# Patient Record
Sex: MALE | Race: AMERICAN INDIAN OR ALASKA NATIVE | NOT HISPANIC OR LATINO | Employment: OTHER | URBAN - METROPOLITAN AREA
[De-identification: names, ages, dates, MRNs, and addresses within clinical notes are randomized per-mention and may not be internally consistent; named-entity substitution may affect disease eponyms.]

---

## 2023-07-07 LAB
ACTIVATED PARTIAL THROMBOPLASTIN TIME IN PPP BY COAGULATION ASSAY: 35 SEC (ref 27–38)
ALANINE AMINOTRANSFERASE (SGPT) (U/L) IN SER/PLAS: 18 U/L (ref 10–52)
ALBUMIN (G/DL) IN SER/PLAS: 4.7 G/DL (ref 3.4–5)
ALKALINE PHOSPHATASE (U/L) IN SER/PLAS: 42 U/L (ref 33–120)
ANION GAP IN SER/PLAS: 11 MMOL/L (ref 10–20)
ASPARTATE AMINOTRANSFERASE (SGOT) (U/L) IN SER/PLAS: 19 U/L (ref 9–39)
BASOPHILS (10*3/UL) IN BLOOD BY AUTOMATED COUNT: 0.02 X10E9/L (ref 0–0.1)
BASOPHILS/100 LEUKOCYTES IN BLOOD BY AUTOMATED COUNT: 0.4 % (ref 0–2)
BILIRUBIN TOTAL (MG/DL) IN SER/PLAS: 0.6 MG/DL (ref 0–1.2)
CALCIUM (MG/DL) IN SER/PLAS: 9.6 MG/DL (ref 8.6–10.3)
CARBON DIOXIDE, TOTAL (MMOL/L) IN SER/PLAS: 29 MMOL/L (ref 21–32)
CHLORIDE (MMOL/L) IN SER/PLAS: 103 MMOL/L (ref 98–107)
CREATININE (MG/DL) IN SER/PLAS: 0.93 MG/DL (ref 0.5–1.3)
EOSINOPHILS (10*3/UL) IN BLOOD BY AUTOMATED COUNT: 0.15 X10E9/L (ref 0–0.7)
EOSINOPHILS/100 LEUKOCYTES IN BLOOD BY AUTOMATED COUNT: 2.9 % (ref 0–6)
ERYTHROCYTE DISTRIBUTION WIDTH (RATIO) BY AUTOMATED COUNT: 12 % (ref 11.5–14.5)
ERYTHROCYTE MEAN CORPUSCULAR HEMOGLOBIN CONCENTRATION (G/DL) BY AUTOMATED: 33.3 G/DL (ref 32–36)
ERYTHROCYTE MEAN CORPUSCULAR VOLUME (FL) BY AUTOMATED COUNT: 87 FL (ref 80–100)
ERYTHROCYTES (10*6/UL) IN BLOOD BY AUTOMATED COUNT: 5.21 X10E12/L (ref 4.5–5.9)
GFR MALE: >90 ML/MIN/1.73M2
GLUCOSE (MG/DL) IN SER/PLAS: 93 MG/DL (ref 74–99)
HEMATOCRIT (%) IN BLOOD BY AUTOMATED COUNT: 45.4 % (ref 41–52)
HEMOGLOBIN (G/DL) IN BLOOD: 15.1 G/DL (ref 13.5–17.5)
IMMATURE GRANULOCYTES/100 LEUKOCYTES IN BLOOD BY AUTOMATED COUNT: 0.2 % (ref 0–0.9)
INR IN PPP BY COAGULATION ASSAY: 1.1 (ref 0.9–1.1)
LEUKOCYTES (10*3/UL) IN BLOOD BY AUTOMATED COUNT: 5.2 X10E9/L (ref 4.4–11.3)
LYMPHOCYTES (10*3/UL) IN BLOOD BY AUTOMATED COUNT: 1.88 X10E9/L (ref 1.2–4.8)
LYMPHOCYTES/100 LEUKOCYTES IN BLOOD BY AUTOMATED COUNT: 35.9 % (ref 13–44)
MONOCYTES (10*3/UL) IN BLOOD BY AUTOMATED COUNT: 0.4 X10E9/L (ref 0.1–1)
MONOCYTES/100 LEUKOCYTES IN BLOOD BY AUTOMATED COUNT: 7.6 % (ref 2–10)
NEUTROPHILS (10*3/UL) IN BLOOD BY AUTOMATED COUNT: 2.78 X10E9/L (ref 1.2–7.7)
NEUTROPHILS/100 LEUKOCYTES IN BLOOD BY AUTOMATED COUNT: 53 % (ref 40–80)
PLATELETS (10*3/UL) IN BLOOD AUTOMATED COUNT: 220 X10E9/L (ref 150–450)
POTASSIUM (MMOL/L) IN SER/PLAS: 4 MMOL/L (ref 3.5–5.3)
PROTEIN TOTAL: 6.9 G/DL (ref 6.4–8.2)
PROTHROMBIN TIME (PT) IN PPP BY COAGULATION ASSAY: 12.1 SEC (ref 9.8–12.8)
SODIUM (MMOL/L) IN SER/PLAS: 139 MMOL/L (ref 136–145)
UREA NITROGEN (MG/DL) IN SER/PLAS: 14 MG/DL (ref 6–23)

## 2023-07-24 ENCOUNTER — HOSPITAL ENCOUNTER (OUTPATIENT)
Dept: DATA CONVERSION | Facility: HOSPITAL | Age: 28
End: 2023-07-24
Attending: ORTHOPAEDIC SURGERY | Admitting: ORTHOPAEDIC SURGERY
Payer: MEDICARE

## 2023-07-24 DIAGNOSIS — M67.51 PLICA SYNDROME, RIGHT KNEE: ICD-10-CM

## 2023-07-24 DIAGNOSIS — M23.91 UNSPECIFIED INTERNAL DERANGEMENT OF RIGHT KNEE: ICD-10-CM

## 2023-09-30 NOTE — H&P
History & Physical Reviewed:   I have reviewed the History and Physical dated:  18-Jul-2023   History and Physical reviewed and relevant findings noted. Patient examined to review pertinent physical  findings.: No significant changes   Home Medications Reviewed: no changes noted   Allergies Reviewed: no changes noted     Consent:   COVID-19 Consent:  ·  COVID-19 Risk Consent Surgeon has reviewed key risks related to the risk of marti COVID-19 and if they contract COVID-19 what the risks are.       Electronic Signatures:  Dario Henry)  (Signed 24-Jul-2023 07:36)   Authored: History & Physical Reviewed, Consent, Note  Completion      Last Updated: 24-Jul-2023 07:36 by Dario Henry)

## 2023-10-02 NOTE — OP NOTE
PROCEDURE DETAILS    Preoperative Diagnosis:  Right knee medial plica syndrome    Postoperative Diagnosis:  Right knee medial plica syndrome    Surgeon: Dario Henry  Resident/Fellow/Other Assistant: Galo Clinton    Procedure:  Right knee arthroscopy excision of medial plica chondroplasty medial femoral condyle    Anesthesia: General + local   Estimated Blood Loss: 5 ml   Findings: see op report         Operative Report:   Operative findings: (Outerbridge classification 0-4)   Patella: 0  Trochlea: 0  Medial compartment: Small area of chondral abrasion/hypertrophy likely mechanical most medial aspect not articulating  Lateral compartment: 0    Operative Indications:  Patient was noted to have internal derangement of the knee refractory to non-surgical modalities.  We discussed associated interventions and the risks of the surgery, including DVT/PE, infection, stiffness, medical complications,  and incomplete relief of pre-operative symptoms.    Implants:  None    Operative Procedure:  The patient was met prior to surgery and the appropriate extremity was marked.  We reviewed recent health history and found no contraindication to proceeding with the planned procedure.  The patient was transported to the operating room and underwent  general anesthesia.  The patient was positioned supine on the operative table with all kenny prominences well-padded. A sequential compression device was placed on the contralateral leg.  A non-sterile thigh tourniquet was placed and a padded lateral  thigh post.    The leg was prepped and draped in the usual sterile fashion.  A timeout was completed and antibiotic administration was in accordance with standard protocol.  The leg was exsanguinated using an Esmarch bandage and the tourniquet was inflated.  The superficial  landmarks of the knee were palpated and anteromedial and anterolateral portals were created.    A diagnostic arthroscopy was performed utilizing a fluid pump  with sterile saline.  The articular surface of the patella, trochlea, medial and lateral femoral condyles and tibial plateaus were evaluated.  The Outerbridge classifications are listed above.   The gutters were evaluated and no loose bodies were noted. The medial compartment was entered with valgus stress in a slightly flexed knee against the lateral post.  The assistant helped with this to facilitate visualization.  The meniscus was probed  superiorly and inferiorly using a blunt probe.  The notch was inspected and the ACL and PCL were healthy in appearance and had appropriate tension when probed.  The knee was then flexed into a figure of four position and the lateral compartment was entered.       The menisci were normal and healthy, there was some hypertrophic cartilage and synovium along the medial border of the medial femoral condyle, a nonarticulating portion likely consistent with mechanical abrasion from medial plica.  This was debrided using  a shaver and the plica was excised using the shaver as well.  The menisci ligaments articular cartilage were all normal outside of this area.    The knee was irrigated and lavaged to remove and loose meniscal or chondral debris.  A second pass was made diagnostically to confirm removal of any fragments and inspect for any additional pathology.  The residual fluid was expressed from the knee.   The portals were closed using a buried 3-0 monocryl suture.  Local anesthetic was injected into the soft tissue around the portals. Sterile bandages were placed.  The patient was stable to the recovery room.    I was present and participated in the entire procedure. The Physician Assistant participated in all critical elements of the procedure under my direct supervision. The surgical incision was closed by the PA under my indirect supervision. There were no  qualified residents available to assist.    Complications: none    Specimen: none    The physician assistant was present  for the entire case.  Given the nature of the procedure and disease process a skilled surgical assistant was  necessary for the case.  The assistant was necessary for retraction and helped directly facilitate completion of the surgery.  A certified scrub tech was at the back table managing instruments and supplies for the surgical procedure.  Note Recipients:   Dario Henry MD - [id number] [PREFERRED]                        Attestation:   Note Completion:  Attending Attestation I performed the procedure without a resident         Electronic Signatures:  Dario Henry)  (Signed 24-Jul-2023 09:10)   Authored: Post-Operative Note, Chart Review, Note Completion      Last Updated: 24-Jul-2023 09:10 by Dario Henry)

## 2024-01-18 ENCOUNTER — LAB (OUTPATIENT)
Dept: LAB | Facility: LAB | Age: 29
End: 2024-01-18
Payer: MEDICARE

## 2024-01-18 ENCOUNTER — OFFICE VISIT (OUTPATIENT)
Dept: PRIMARY CARE | Facility: CLINIC | Age: 29
End: 2024-01-18
Payer: MEDICARE

## 2024-01-18 VITALS
WEIGHT: 163 LBS | DIASTOLIC BLOOD PRESSURE: 72 MMHG | SYSTOLIC BLOOD PRESSURE: 114 MMHG | OXYGEN SATURATION: 98 % | BODY MASS INDEX: 26.2 KG/M2 | TEMPERATURE: 98.2 F | HEIGHT: 66 IN | RESPIRATION RATE: 16 BRPM | HEART RATE: 66 BPM

## 2024-01-18 DIAGNOSIS — Z00.00 ANNUAL PHYSICAL EXAM: ICD-10-CM

## 2024-01-18 DIAGNOSIS — J45.30 MILD PERSISTENT ASTHMA WITHOUT COMPLICATION (HHS-HCC): ICD-10-CM

## 2024-01-18 DIAGNOSIS — Z23 NEED FOR TDAP VACCINATION: ICD-10-CM

## 2024-01-18 DIAGNOSIS — Z00.00 ANNUAL PHYSICAL EXAM: Primary | ICD-10-CM

## 2024-01-18 PROBLEM — J45.40 ASTHMA, MODERATE PERSISTENT (HHS-HCC): Status: ACTIVE | Noted: 2024-01-18

## 2024-01-18 LAB
CHOLEST SERPL-MCNC: 134 MG/DL (ref 0–199)
CHOLESTEROL/HDL RATIO: 2.7
HDLC SERPL-MCNC: 49.8 MG/DL
LDLC SERPL CALC-MCNC: 72 MG/DL
NON HDL CHOLESTEROL: 84 MG/DL (ref 0–149)
TRIGL SERPL-MCNC: 60 MG/DL (ref 0–149)
VLDL: 12 MG/DL (ref 0–40)

## 2024-01-18 PROCEDURE — 80061 LIPID PANEL: CPT

## 2024-01-18 PROCEDURE — 1036F TOBACCO NON-USER: CPT

## 2024-01-18 PROCEDURE — 36415 COLL VENOUS BLD VENIPUNCTURE: CPT

## 2024-01-18 PROCEDURE — 99385 PREV VISIT NEW AGE 18-39: CPT

## 2024-01-18 RX ORDER — FLUTICASONE PROPIONATE AND SALMETEROL 100; 50 UG/1; UG/1
1 POWDER RESPIRATORY (INHALATION)
Qty: 60 EACH | Refills: 11 | Status: SHIPPED | OUTPATIENT
Start: 2024-01-18 | End: 2024-01-18 | Stop reason: SDUPTHER

## 2024-01-18 RX ORDER — ALBUTEROL SULFATE 90 UG/1
2 INHALANT RESPIRATORY (INHALATION) EVERY 4 HOURS
COMMUNITY
Start: 2019-10-30

## 2024-01-18 RX ORDER — FLUTICASONE PROPIONATE AND SALMETEROL 100; 50 UG/1; UG/1
1 POWDER RESPIRATORY (INHALATION)
Qty: 60 EACH | Refills: 3 | Status: SHIPPED | OUTPATIENT
Start: 2024-01-18 | End: 2024-05-17

## 2024-01-18 SDOH — ECONOMIC STABILITY: TRANSPORTATION INSECURITY
IN THE PAST 12 MONTHS, HAS LACK OF TRANSPORTATION KEPT YOU FROM MEETINGS, WORK, OR FROM GETTING THINGS NEEDED FOR DAILY LIVING?: NO

## 2024-01-18 SDOH — HEALTH STABILITY: PHYSICAL HEALTH: ON AVERAGE, HOW MANY DAYS PER WEEK DO YOU ENGAGE IN MODERATE TO STRENUOUS EXERCISE (LIKE A BRISK WALK)?: 6 DAYS

## 2024-01-18 SDOH — HEALTH STABILITY: PHYSICAL HEALTH: ON AVERAGE, HOW MANY MINUTES DO YOU ENGAGE IN EXERCISE AT THIS LEVEL?: 60 MIN

## 2024-01-18 SDOH — ECONOMIC STABILITY: FOOD INSECURITY: WITHIN THE PAST 12 MONTHS, THE FOOD YOU BOUGHT JUST DIDN'T LAST AND YOU DIDN'T HAVE MONEY TO GET MORE.: NEVER TRUE

## 2024-01-18 SDOH — ECONOMIC STABILITY: FOOD INSECURITY: WITHIN THE PAST 12 MONTHS, YOU WORRIED THAT YOUR FOOD WOULD RUN OUT BEFORE YOU GOT MONEY TO BUY MORE.: NEVER TRUE

## 2024-01-18 SDOH — ECONOMIC STABILITY: INCOME INSECURITY: IN THE LAST 12 MONTHS, WAS THERE A TIME WHEN YOU WERE NOT ABLE TO PAY THE MORTGAGE OR RENT ON TIME?: NO

## 2024-01-18 SDOH — ECONOMIC STABILITY: HOUSING INSECURITY
IN THE LAST 12 MONTHS, WAS THERE A TIME WHEN YOU DID NOT HAVE A STEADY PLACE TO SLEEP OR SLEPT IN A SHELTER (INCLUDING NOW)?: NO

## 2024-01-18 SDOH — ECONOMIC STABILITY: TRANSPORTATION INSECURITY
IN THE PAST 12 MONTHS, HAS THE LACK OF TRANSPORTATION KEPT YOU FROM MEDICAL APPOINTMENTS OR FROM GETTING MEDICATIONS?: NO

## 2024-01-18 ASSESSMENT — LIFESTYLE VARIABLES
HOW OFTEN DO YOU HAVE A DRINK CONTAINING ALCOHOL: 2-3 TIMES A WEEK
HOW MANY STANDARD DRINKS CONTAINING ALCOHOL DO YOU HAVE ON A TYPICAL DAY: 1 OR 2
AUDIT-C TOTAL SCORE: 4
HOW OFTEN DO YOU HAVE SIX OR MORE DRINKS ON ONE OCCASION: LESS THAN MONTHLY
SKIP TO QUESTIONS 9-10: 0

## 2024-01-18 ASSESSMENT — SOCIAL DETERMINANTS OF HEALTH (SDOH)
WITHIN THE LAST YEAR, HAVE YOU BEEN HUMILIATED OR EMOTIONALLY ABUSED IN OTHER WAYS BY YOUR PARTNER OR EX-PARTNER?: NO
WITHIN THE LAST YEAR, HAVE TO BEEN RAPED OR FORCED TO HAVE ANY KIND OF SEXUAL ACTIVITY BY YOUR PARTNER OR EX-PARTNER?: NO
HOW OFTEN DO YOU ATTEND CHURCH OR RELIGIOUS SERVICES?: MORE THAN 4 TIMES PER YEAR
IN A TYPICAL WEEK, HOW MANY TIMES DO YOU TALK ON THE PHONE WITH FAMILY, FRIENDS, OR NEIGHBORS?: ONCE A WEEK
DO YOU BELONG TO ANY CLUBS OR ORGANIZATIONS SUCH AS CHURCH GROUPS UNIONS, FRATERNAL OR ATHLETIC GROUPS, OR SCHOOL GROUPS?: NO
HOW OFTEN DO YOU GET TOGETHER WITH FRIENDS OR RELATIVES?: TWICE A WEEK
ARE YOU MARRIED, WIDOWED, DIVORCED, SEPARATED, NEVER MARRIED, OR LIVING WITH A PARTNER?: NEVER MARRIED
IN THE PAST 12 MONTHS, HAS THE ELECTRIC, GAS, OIL, OR WATER COMPANY THREATENED TO SHUT OFF SERVICE IN YOUR HOME?: NO
HOW OFTEN DO YOU ATTENT MEETINGS OF THE CLUB OR ORGANIZATION YOU BELONG TO?: NEVER
WITHIN THE LAST YEAR, HAVE YOU BEEN AFRAID OF YOUR PARTNER OR EX-PARTNER?: NO
HOW HARD IS IT FOR YOU TO PAY FOR THE VERY BASICS LIKE FOOD, HOUSING, MEDICAL CARE, AND HEATING?: NOT HARD AT ALL
WITHIN THE LAST YEAR, HAVE YOU BEEN KICKED, HIT, SLAPPED, OR OTHERWISE PHYSICALLY HURT BY YOUR PARTNER OR EX-PARTNER?: NO

## 2024-01-18 ASSESSMENT — PATIENT HEALTH QUESTIONNAIRE - PHQ9
2. FEELING DOWN, DEPRESSED OR HOPELESS: NOT AT ALL
1. LITTLE INTEREST OR PLEASURE IN DOING THINGS: NOT AT ALL
SUM OF ALL RESPONSES TO PHQ9 QUESTIONS 1 & 2: 0

## 2024-01-18 NOTE — PROGRESS NOTES
"Subjective   Donald Apple is a 28 y.o. male who presents for Annual Exam.  Here today for annual well visit. Has not seen a doctor in some time. No acute concerns today.     Asthma: Has been Dx since childhood. Was using advair but insurance stopped covering. Only has albuterol PRN 4-5x a week. Most recent asthma attack was 3-4 months ago. Has not been seen in the hospital for asthma since childhood. Never smoker. No smokeless tobacco.     Drinks 3 whiskeys a week. No drug use.     Is self employed, is a . Has been doing that for many years. Very physically active.             Comprehensive Medical/Surgical/Social/Family History  Past Medical History:   Diagnosis Date    Otalgia, right ear 09/12/2017    Otalgia, right    Otitis media, unspecified, bilateral 09/12/2017    Acute otitis media, bilateral    Personal history of other diseases of the respiratory system     History of sinusitis    Personal history of other diseases of the respiratory system     History of asthma    Sprain of metacarpophalangeal joint of left thumb, initial encounter 01/24/2018    Sprain of metacarpophalangeal (MCP) joint of left thumb, initial encounter     Past Surgical History:   Procedure Laterality Date    MR SHOULDER ARTHROGRAM LEFT W FL GUIDED INJECTION Left 3/2/2016    MR SHOULDER ARTHROGRAM LEFT W FL GUIDED INJECTION [facility]    TONSILLECTOMY  01/24/2018    Tonsillectomy     Social History   Reviewed as per HPI.     Allergies and Medications  Clarithromycin  Current Outpatient Medications on File Prior to Visit   Medication Sig Dispense Refill    albuterol sulfate (Proair Digihaler) 90 mcg/actuation aero powdr breath act w/sensor inhaler Inhale 2 puffs every 4 hours.       No current facility-administered medications on file prior to visit.       /72 (BP Location: Left arm, Patient Position: Sitting, BP Cuff Size: Adult)   Pulse 66   Temp 36.8 °C (98.2 °F)   Resp 16   Ht 1.664 m (5' 5.5\")   Wt " 73.9 kg (163 lb)   SpO2 98%   BMI 26.71 kg/m²   Objective   Physical Exam  Constitutional:       General: He is not in acute distress.     Appearance: Normal appearance.   HENT:      Head: Normocephalic and atraumatic.      Right Ear: Tympanic membrane and ear canal normal.      Left Ear: Tympanic membrane and ear canal normal.      Nose: Nose normal.      Mouth/Throat:      Mouth: Mucous membranes are moist.      Pharynx: Oropharynx is clear. No posterior oropharyngeal erythema.   Eyes:      Extraocular Movements: Extraocular movements intact.      Conjunctiva/sclera: Conjunctivae normal.      Pupils: Pupils are equal, round, and reactive to light.   Cardiovascular:      Rate and Rhythm: Normal rate and regular rhythm.      Pulses: Normal pulses.      Heart sounds: Normal heart sounds.   Pulmonary:      Effort: Pulmonary effort is normal.      Breath sounds: Normal breath sounds.   Skin:     General: Skin is warm and dry.   Neurological:      General: No focal deficit present.      Mental Status: He is alert. Mental status is at baseline.   Psychiatric:         Mood and Affect: Mood normal.         Thought Content: Thought content normal.         Assessment/Plan   1. Annual physical exam  - Lipid panel; Future  - Spirometry Pre/Post Bronchodilator; Future    2. Need for Tdap vaccination  -[facility] insurance will not cover in clinic. Advised patient to obtain at local pharmacy. Patient understands and agrees    3. Mild persistent asthma without complication  -Patient has not had recent PFTs. We will proceed with obtaining new PFTs   - Spirometry Pre/Post Bronchodilator; Future  - fluticasone propion-salmeteroL (Advair Diskus) 100-50 mcg/dose diskus inhaler; Inhale 1 puff 2 times a day. Rinse mouth with water after use to reduce aftertaste and incidence of candidiasis. Do not swallow.  Dispense: 60 each; Refill: 11  -We discussed at length the coverage issues that may or may not occur with the Advair  prescription.  I advised patient to call our office or message on MyChart if he has any issues with cost or insurance coverage for the Advair.  The patient would benefit greatly from the combo steroid/LABA inhaler        Rhys Proctor D.O.   Family Medicine PGY-1, [facility]  01/18/24

## 2024-01-18 NOTE — PATIENT INSTRUCTIONS
It was nice seeing you in the office today.  Sign up for [employer] to get results instantly.  Obtain labs/imaging as discussed during our visit.   Follow up as needed.   Call the office: [phone] for questions or concerns.  Please allow 48-72 hours for medication refills.    Rhys Proctor D.O.   Family Medicine PGY-1, [facility]  01/18/24

## 2024-01-22 NOTE — PROGRESS NOTES
I reviewed with the resident the medical history and the resident’s findings on physical examination.  I discussed with the resident the patient’s diagnosis and concur with the treatment plan as documented in the resident note.     Tucker Fontenot, DO